# Patient Record
Sex: FEMALE
[De-identification: names, ages, dates, MRNs, and addresses within clinical notes are randomized per-mention and may not be internally consistent; named-entity substitution may affect disease eponyms.]

---

## 2020-09-08 ENCOUNTER — APPOINTMENT (OUTPATIENT)
Dept: UROLOGY | Facility: CLINIC | Age: 80
End: 2020-09-08
Payer: MEDICARE

## 2020-09-08 VITALS
HEIGHT: 69 IN | BODY MASS INDEX: 32.58 KG/M2 | DIASTOLIC BLOOD PRESSURE: 82 MMHG | WEIGHT: 220 LBS | SYSTOLIC BLOOD PRESSURE: 145 MMHG

## 2020-09-08 DIAGNOSIS — R10.9 UNSPECIFIED ABDOMINAL PAIN: ICD-10-CM

## 2020-09-08 DIAGNOSIS — I10 ESSENTIAL (PRIMARY) HYPERTENSION: ICD-10-CM

## 2020-09-08 PROCEDURE — 99203 OFFICE O/P NEW LOW 30 MIN: CPT

## 2020-09-17 NOTE — ASSESSMENT
[FreeTextEntry1] : Reviewed outside records. \par \par Urinary incontinence:\par Calcium Oxalate crystals in urine: \par Will get Urinalysis and Urine culture. \par Will get Renal and Bladder Ultrasound. \par Will consider anti cholinergic or B3 agonist. \par \par Return to office after Ultrasound.

## 2020-09-17 NOTE — LETTER BODY
[Dear  ___] : Dear  [unfilled], [Consult Letter:] : I had the pleasure of evaluating your patient, [unfilled]. [( Thank you for referring [unfilled] for consultation for _____ )] : Thank you for referring [unfilled] for consultation for [unfilled] [Please see my note below.] : Please see my note below. [Consult Closing:] : Thank you very much for allowing me to participate in the care of this patient.  If you have any questions, please do not hesitate to contact me. [Sincerely,] : Sincerely, [FreeTextEntry3] : Edenilson De La Garza MD\par  of Urology\par [facility] [employer]\par \par Offices:\par [facility] @\par [address], [place] [address]\par and\par [address], [place] [address], [address]\par and\par [address] [address]\par \par TEL: [phone]\par FAX: [phone]

## 2020-09-17 NOTE — HISTORY OF PRESENT ILLNESS
[FreeTextEntry1] : 81 yo female presents for urinary incontinence. \par Normal daytime frequency is every 1-2 hours or so. Nocturia of 3 x. \par Endorses urgency, urge incontinence. Denies incontinence with cough and sneeze. Wears pads. \par Has off and on dysuria. Denies hematuria, lower abdominal or flank pain, fever, chills or rigors.\par

## 2020-09-29 ENCOUNTER — APPOINTMENT (OUTPATIENT)
Dept: UROLOGY | Facility: CLINIC | Age: 80
End: 2020-09-29
Payer: MEDICARE

## 2020-09-29 DIAGNOSIS — R32 UNSPECIFIED URINARY INCONTINENCE: ICD-10-CM

## 2020-09-29 DIAGNOSIS — R82.998 OTHER ABNORMAL FINDINGS IN URINE: ICD-10-CM

## 2020-09-29 PROCEDURE — 99214 OFFICE O/P EST MOD 30 MIN: CPT

## 2020-09-30 LAB
APPEARANCE: CLEAR
BACTERIA UR CULT: ABNORMAL
BACTERIA: ABNORMAL
BILIRUBIN URINE: NEGATIVE
BLOOD URINE: NEGATIVE
COLOR: NORMAL
GLUCOSE QUALITATIVE U: NEGATIVE
HYALINE CASTS: 1 /LPF
KETONES URINE: NEGATIVE
LEUKOCYTE ESTERASE URINE: ABNORMAL
MICROSCOPIC-UA: NORMAL
NITRITE URINE: NEGATIVE
PH URINE: 6
PROTEIN URINE: NORMAL
RED BLOOD CELLS URINE: 6 /HPF
SPECIFIC GRAVITY URINE: 1.02
SQUAMOUS EPITHELIAL CELLS: 2 /HPF
UROBILINOGEN URINE: NORMAL
WHITE BLOOD CELLS URINE: 34 /HPF

## 2020-10-05 PROBLEM — R82.998 CALCIUM OXALATE CRYSTALS IN URINE: Status: ACTIVE | Noted: 2020-09-08

## 2020-10-05 PROBLEM — R32 URINARY INCONTINENCE IN FEMALE: Status: ACTIVE | Noted: 2020-09-08

## 2020-10-05 NOTE — LETTER BODY
[Dear  ___] : Dear  [unfilled], [Courtesy Letter:] : I had the pleasure of seeing your patient, [unfilled], in my office today. [Please see my note below.] : Please see my note below. [Sincerely,] : Sincerely, [FreeTextEntry3] : Edenilson De La Garza MD\par  of Urology\par [facility] [employer] [facility]\par \par Offices:\par [facility] @\par [address], [place] [address]\par and\par [address], [place] [address], [address]\par and\par [address] [address]\par \par TEL: [phone]\par FAX: [phone]

## 2020-10-05 NOTE — HISTORY OF PRESENT ILLNESS
[FreeTextEntry1] : 79 yo female presents for follow up. \par Urination same. \par Still has off and on dysuria. Denies dysuria, hematuria, lower abdominal or flank pain, fever, chills or rigors. \par Had Ultrasound. \par \par Initially seen for urinary incontinence. \par Normal daytime frequency is every 1-2 hours or so. Nocturia of 3 x. \par Endorsed urgency, urge incontinence. Denies incontinence with cough and sneeze. Wears pads. \par Has off and on dysuria. Denied hematuria, lower abdominal or flank pain, fever, chills or rigors.\par

## 2020-10-05 NOTE — ASSESSMENT
[FreeTextEntry1] : ESBL Urinary tract infection:\par Discussed 60 % effectivity of oral Antibiotics. \par Nitrofurantoin. \par Recommended repeat Urine test 1 week after completion of Antibiotics course. \par \par Urinary incontinence:\par Will consider anti cholinergic or B3 agonist if repeat urine negative for infection. \par \par Calcium Oxalate crystals in urine:\par Kidney stone:\par Discussed the management options for kidney stones- watch and wait Vs Ureteroscopy Vs Shock wave lithotripsy- if radio-opaque or ultrasound amenable Vs Percutaneous nephrolithotomy. \par Risks and benefits of each modality were discussed. \par Patient will consider options. \par \par Return to office in 3 weeks or sooner if any issues. \par  \par

## 2020-10-05 NOTE — PHYSICAL EXAM
[General Appearance - Well Developed] : well developed [Normal Appearance] : normal appearance [General Appearance - In No Acute Distress] : no acute distress [Abdomen Soft] : soft [Abdomen Tenderness] : non-tender [Skin Color & Pigmentation] : normal skin color and pigmentation [FreeTextEntry1] : normal peripheral circulation  [] : no respiratory distress [Oriented To Time, Place, And Person] : oriented to person, place, and time

## 2020-10-20 ENCOUNTER — APPOINTMENT (OUTPATIENT)
Dept: UROLOGY | Facility: CLINIC | Age: 80
End: 2020-10-20
Payer: MEDICARE

## 2020-10-20 DIAGNOSIS — N39.0 URINARY TRACT INFECTION, SITE NOT SPECIFIED: ICD-10-CM

## 2020-10-20 DIAGNOSIS — B96.29 URINARY TRACT INFECTION, SITE NOT SPECIFIED: ICD-10-CM

## 2020-10-20 DIAGNOSIS — Z16.12 URINARY TRACT INFECTION, SITE NOT SPECIFIED: ICD-10-CM

## 2020-10-20 DIAGNOSIS — N20.0 CALCULUS OF KIDNEY: ICD-10-CM

## 2020-10-20 DIAGNOSIS — N32.81 OVERACTIVE BLADDER: ICD-10-CM

## 2020-10-20 PROCEDURE — 51701 INSERT BLADDER CATHETER: CPT

## 2020-10-20 PROCEDURE — 99214 OFFICE O/P EST MOD 30 MIN: CPT | Mod: 25

## 2020-10-20 RX ORDER — NITROFURANTOIN (MONOHYDRATE/MACROCRYSTALS) 25; 75 MG/1; MG/1
100 CAPSULE ORAL TWICE DAILY
Qty: 14 | Refills: 0 | Status: DISCONTINUED | COMMUNITY
Start: 2020-09-29 | End: 2020-10-20

## 2020-10-20 NOTE — LETTER BODY
[Courtesy Letter:] : I had the pleasure of seeing your patient, [unfilled], in my office today. [Dear  ___] : Dear  [unfilled], [Please see my note below.] : Please see my note below. [Sincerely,] : Sincerely, [FreeTextEntry3] : Edenilson De La Garza MD\par  of Urology\par [facility]\par \par Offices:\par [facility] @\par [address], [place] [address]\par and\par [address], [place] [address], [address]\par and\par [address] [address]\par \par TEL: [phone]\par FAX: [phone]

## 2020-10-20 NOTE — PHYSICAL EXAM
[Normal Appearance] : normal appearance [General Appearance - In No Acute Distress] : no acute distress [Skin Color & Pigmentation] : normal skin color and pigmentation [] : no respiratory distress [Oriented To Time, Place, And Person] : oriented to person, place, and time [FreeTextEntry1] : normal peripheral circulation

## 2020-10-20 NOTE — ASSESSMENT
[FreeTextEntry1] : Urinary tract infection:\par Obtained catheterized specimen: Will get Urinalysis and Urine culture. Will inform results. \par \par Kidney stone:\par Wants to observe for now. \par \par Overactive Bladder:\par Taking Myrbetriq 50 mg. Has not seen much difference. \par Gave Myrbetriq 50 mg samples for 2 more weeks. \par Asked patient to call us and update us in a few weeks. \par \par

## 2020-10-20 NOTE — HISTORY OF PRESENT ILLNESS
[FreeTextEntry1] : 81 yo female presents for follow up. \par Urination same. Taking Myrbetriq 50 mg. \par Still has off and on dysuria. Denies dysuria, hematuria, lower abdominal or flank pain, fever, chills or rigors. \par \par Initially seen for urinary incontinence. \par Normal daytime frequency is every 1-2 hours or so. Nocturia of 3 x. \par Endorsed urgency, urge incontinence. Denies incontinence with cough and sneeze. Wears pads. \par Has off and on dysuria. Denied hematuria, lower abdominal or flank pain, fever, chills or rigors.\par

## 2020-10-21 LAB
APPEARANCE: CLEAR
BACTERIA: NEGATIVE
BILIRUBIN URINE: NEGATIVE
BLOOD URINE: NEGATIVE
COLOR: NORMAL
GLUCOSE QUALITATIVE U: NEGATIVE
HYALINE CASTS: 1 /LPF
KETONES URINE: NEGATIVE
LEUKOCYTE ESTERASE URINE: NEGATIVE
MICROSCOPIC-UA: NORMAL
NITRITE URINE: NEGATIVE
PH URINE: 6
PROTEIN URINE: NORMAL
RED BLOOD CELLS URINE: 1 /HPF
SPECIFIC GRAVITY URINE: 1.02
SQUAMOUS EPITHELIAL CELLS: 1 /HPF
UROBILINOGEN URINE: NORMAL
WHITE BLOOD CELLS URINE: 0 /HPF

## 2020-10-22 ENCOUNTER — NON-APPOINTMENT (OUTPATIENT)
Age: 80
End: 2020-10-22

## 2020-10-22 LAB — BACTERIA UR CULT: NORMAL

## 2020-10-23 ENCOUNTER — NON-APPOINTMENT (OUTPATIENT)
Age: 80
End: 2020-10-23